# Patient Record
Sex: FEMALE | Race: WHITE | NOT HISPANIC OR LATINO | Employment: PART TIME | URBAN - METROPOLITAN AREA
[De-identification: names, ages, dates, MRNs, and addresses within clinical notes are randomized per-mention and may not be internally consistent; named-entity substitution may affect disease eponyms.]

---

## 2023-09-04 DIAGNOSIS — F32.A DEPRESSION, UNSPECIFIED DEPRESSION TYPE: ICD-10-CM

## 2023-09-05 RX ORDER — BUPROPION HYDROCHLORIDE 300 MG/1
TABLET ORAL
Qty: 30 TABLET | Refills: 0 | Status: SHIPPED | OUTPATIENT
Start: 2023-09-05 | End: 2023-09-28

## 2023-09-28 DIAGNOSIS — F32.A DEPRESSION, UNSPECIFIED DEPRESSION TYPE: ICD-10-CM

## 2023-09-28 RX ORDER — BUPROPION HYDROCHLORIDE 300 MG/1
TABLET ORAL
Qty: 30 TABLET | Refills: 0 | Status: SHIPPED | OUTPATIENT
Start: 2023-09-28 | End: 2023-09-29 | Stop reason: SDUPTHER

## 2023-09-29 ENCOUNTER — OFFICE VISIT (OUTPATIENT)
Dept: PRIMARY CARE | Facility: CLINIC | Age: 28
End: 2023-09-29
Payer: COMMERCIAL

## 2023-09-29 VITALS
OXYGEN SATURATION: 99 % | TEMPERATURE: 98 F | DIASTOLIC BLOOD PRESSURE: 72 MMHG | HEIGHT: 65 IN | WEIGHT: 163 LBS | HEART RATE: 93 BPM | BODY MASS INDEX: 27.16 KG/M2 | RESPIRATION RATE: 16 BRPM | SYSTOLIC BLOOD PRESSURE: 105 MMHG

## 2023-09-29 DIAGNOSIS — F32.A DEPRESSION, UNSPECIFIED DEPRESSION TYPE: ICD-10-CM

## 2023-09-29 DIAGNOSIS — Z00.00 HEALTHCARE MAINTENANCE: Primary | ICD-10-CM

## 2023-09-29 PROCEDURE — 99395 PREV VISIT EST AGE 18-39: CPT | Performed by: FAMILY MEDICINE

## 2023-09-29 RX ORDER — BUPROPION HYDROCHLORIDE 300 MG/1
300 TABLET ORAL DAILY
Qty: 90 TABLET | Refills: 3 | Status: SHIPPED | OUTPATIENT
Start: 2023-09-29 | End: 2024-09-28

## 2023-09-29 ASSESSMENT — ENCOUNTER SYMPTOMS
NEUROLOGICAL NEGATIVE: 1
GASTROINTESTINAL NEGATIVE: 1
ENDOCRINE NEGATIVE: 1
CARDIOVASCULAR NEGATIVE: 1
PSYCHIATRIC NEGATIVE: 1
RESPIRATORY NEGATIVE: 1
EYES NEGATIVE: 1
CONSTITUTIONAL NEGATIVE: 1

## 2023-09-29 NOTE — PROGRESS NOTES
"Subjective     Patient ID: Alison Weinberg is a 28 y.o. female who presents for Wellbutrin follow up.    Review of Systems   Constitutional: Negative.    HENT: Negative.     Eyes: Negative.    Respiratory: Negative.     Cardiovascular: Negative.    Gastrointestinal: Negative.    Endocrine: Negative.    Genitourinary: Negative.    Skin: Negative.    Neurological: Negative.    Psychiatric/Behavioral: Negative.         Objective     Vitals:    09/29/23 0930   BP: 105/72   Pulse: 93   Resp: 16   Temp: 36.7 °C (98 °F)   SpO2: 99%   Weight: 73.9 kg (163 lb)   Height: 1.651 m (5' 5\")        Current Outpatient Medications   Medication Instructions    buPROPion XL (Wellbutrin XL) 300 mg 24 hr tablet TAKE 1 TABLET BY MOUTH EVERY DAY    Dexcom G6  misc use as directed    Dexcom G6 Sensor device use 1 sensor every 10 days    Dexcom G6 Transmitter device use as directed    HumaLOG U-100 Insulin 100 unit/mL injection 60 units daily via insulin pump    Lantus Solostar U-100 Insulin 100 unit/mL (3 mL) pen subcutaneous    OneTouch Ultra Test strip Test 6 times daily    OneTouch Ultra2 Meter misc USE AS DIRECTED TO TEST BLOOD SUGAR    Vestura, 28, 3-0.02 mg tablet 1 tablet, oral, Daily        Physical Exam  Constitutional:       General: She is not in acute distress.     Appearance: Normal appearance.   HENT:      Head: Normocephalic and atraumatic.      Right Ear: Tympanic membrane normal.      Left Ear: Tympanic membrane normal.      Nose: Nose normal.      Mouth/Throat:      Pharynx: Oropharynx is clear.   Eyes:      Conjunctiva/sclera: Conjunctivae normal.      Pupils: Pupils are equal, round, and reactive to light.   Neck:      Vascular: No carotid bruit.   Cardiovascular:      Rate and Rhythm: Normal rate and regular rhythm.      Heart sounds: Normal heart sounds.   Pulmonary:      Effort: Pulmonary effort is normal.      Breath sounds: Normal breath sounds.   Abdominal:      General: Bowel sounds are normal.      " Palpations: Abdomen is soft.   Musculoskeletal:      Cervical back: Neck supple. No tenderness.   Skin:     General: Skin is warm and dry.   Neurological:      General: No focal deficit present.      Mental Status: She is alert.   Psychiatric:         Mood and Affect: Mood normal.         Behavior: Behavior normal.         Assessment/Plan   Problem List Items Addressed This Visit    None  Visit Diagnoses         Codes    Healthcare maintenance    -  Primary Z00.00

## 2023-11-17 ENCOUNTER — OFFICE VISIT (OUTPATIENT)
Dept: ENDOCRINOLOGY | Facility: CLINIC | Age: 28
End: 2023-11-17
Payer: COMMERCIAL

## 2023-11-17 VITALS
WEIGHT: 163 LBS | HEIGHT: 65 IN | BODY MASS INDEX: 27.16 KG/M2 | DIASTOLIC BLOOD PRESSURE: 75 MMHG | SYSTOLIC BLOOD PRESSURE: 113 MMHG

## 2023-11-17 DIAGNOSIS — Z96.41 INSULIN PUMP IN PLACE: ICD-10-CM

## 2023-11-17 DIAGNOSIS — E10.9 TYPE 1 DIABETES MELLITUS WITHOUT COMPLICATION (MULTI): Primary | ICD-10-CM

## 2023-11-17 LAB
POC FINGERSTICK BLOOD GLUCOSE: 119 MG/DL (ref 70–100)
POC HEMOGLOBIN A1C: 5.3 % (ref 4.2–6.5)

## 2023-11-17 PROCEDURE — 3074F SYST BP LT 130 MM HG: CPT | Performed by: HOSPITALIST

## 2023-11-17 PROCEDURE — 99214 OFFICE O/P EST MOD 30 MIN: CPT | Performed by: HOSPITALIST

## 2023-11-17 PROCEDURE — 3078F DIAST BP <80 MM HG: CPT | Performed by: HOSPITALIST

## 2023-11-17 PROCEDURE — 82962 GLUCOSE BLOOD TEST: CPT | Performed by: HOSPITALIST

## 2023-11-17 PROCEDURE — 83036 HEMOGLOBIN GLYCOSYLATED A1C: CPT | Performed by: HOSPITALIST

## 2023-11-17 RX ORDER — BLOOD SUGAR DIAGNOSTIC
STRIP MISCELLANEOUS
Qty: 200 EACH | Refills: 3 | Status: SHIPPED | OUTPATIENT
Start: 2023-11-17

## 2023-11-17 RX ORDER — GLUCAGON INJECTION, SOLUTION 1 MG/.2ML
1 INJECTION, SOLUTION SUBCUTANEOUS ONCE AS NEEDED
Qty: 0.2 ML | Refills: 1 | Status: SHIPPED | OUTPATIENT
Start: 2023-11-17

## 2023-11-17 ASSESSMENT — ENCOUNTER SYMPTOMS
VOICE CHANGE: 0
PHOTOPHOBIA: 0
AGITATION: 0
SLEEP DISTURBANCE: 0
PALPITATIONS: 0
EYE ITCHING: 0
CONSTIPATION: 0
NERVOUS/ANXIOUS: 0
DYSURIA: 0
CHEST TIGHTNESS: 0
SORE THROAT: 0
TREMORS: 0
FREQUENCY: 0
VOMITING: 0
HEADACHES: 0
NAUSEA: 0
LIGHT-HEADEDNESS: 0
ARTHRALGIAS: 0
SHORTNESS OF BREATH: 0
ABDOMINAL DISTENTION: 0
TROUBLE SWALLOWING: 0
DIARRHEA: 0
ABDOMINAL PAIN: 0
CONSTITUTIONAL NEGATIVE: 1

## 2023-11-17 NOTE — PROGRESS NOTES
"Subjective   Patient ID: Alison Weinberg is a 28 y.o. female who presents for Diabetes (PCP: Rojas /Dx: 5/13/2022 at [facility]: 2 bothers have type 1 diabetes / /Patient testing glucose 4 times daily with dexcom /due to fluctuating blood glucose, hypoglycemia and insulin pump management /Patient is adjusting meal time insulin 3 times daily based on glucose reading and sliding scale./ omnipod 5 and dexcom-LINKED /Last hga1c 4/3/23 result 5.1%).  Lab Results   Component Value Date    HGBA1C 5.3 11/17/2023      HPI  See AP     Review of Systems   Constitutional: Negative.    HENT:  Negative for sore throat, trouble swallowing and voice change.    Eyes:  Negative for photophobia, itching and visual disturbance.   Respiratory:  Negative for chest tightness and shortness of breath.    Cardiovascular:  Negative for chest pain and palpitations.   Gastrointestinal:  Negative for abdominal distention, abdominal pain, constipation, diarrhea, nausea and vomiting.   Endocrine: Negative for cold intolerance, heat intolerance and polyuria.   Genitourinary:  Negative for dysuria and frequency.   Musculoskeletal:  Negative for arthralgias.   Skin:  Negative for pallor.   Allergic/Immunologic: Negative for environmental allergies.   Neurological:  Negative for tremors, light-headedness and headaches.   Psychiatric/Behavioral:  Negative for agitation and sleep disturbance. The patient is not nervous/anxious.        Objective   Visit Vitals  /75   Ht 1.651 m (5' 5\")   Wt 73.9 kg (163 lb)   BMI 27.12 kg/m²   Smoking Status Never   BSA 1.84 m²      Physical Exam  Constitutional:       Appearance: Normal appearance.   HENT:      Head: Normocephalic.      Nose: Nose normal.      Mouth/Throat:      Mouth: Mucous membranes are moist.   Eyes:      Extraocular Movements: Extraocular movements intact.   Cardiovascular:      Rate and Rhythm: Normal rate.   Pulmonary:      Effort: Pulmonary effort is normal. No respiratory distress. "   Abdominal:      General: There is no distension.   Musculoskeletal:         General: Normal range of motion.      Cervical back: Normal range of motion and neck supple.   Skin:     General: Skin is warm and dry.   Neurological:      Mental Status: She is alert and oriented to person, place, and time.   Psychiatric:         Mood and Affect: Mood normal.         Assessment/Plan   Diagnoses and all orders for this visit:  Type 1 diabetes mellitus without complication (CMS/HCC)  -     POCT glucose manually resulted  -     POCT glycosylated hemoglobin (Hb A1C) manually resulted  -     OneTouch Ultra Test strip; Test 6 times daily  -     glucagon (Gvoke HypoPen 1-Pack) 1 mg/0.2 mL auto-injector; Inject 1 mg under the skin 1 time if needed (when sugar less than 70 and unable to take orally) for up to 1 dose.  Insulin pump in place       27 yo woman wit recent Dx of DM type 1 BRADY positive came for follow up   she was admitted to [facility] on May 12th 2022 with DKA and was discharged on may 15th.      She was started on omnipod 5 pump around 12/10/2022. she is also on Dexcom G6  Personally reviewed insulin pump data and interpreted the results  in automated mode 100 % TIR 87%, low 2%   She is more active with her new job and having low BG morning and sometimes afternoons   she comfortable doing carb counting but recently underestimating her carbs given fear of low BG   She feels low BG in her 60s.   Her automated basal / day is ~ 8.3 units/d vs 19.5 on manual mode   she mentions some lows on dexcom false but it does match most of the time      A1c at goal.  Too tight control at this time     PLAN :    Changed basal rate throughout the day- see download   - changed ICR to 12 ( from 8 ) at MN and 7 from 5 ( at 8 AM )   - Continue using activity mode   - no changes in pump today.   - she does have insulin pens in case of pump failure.   Discussed diabetic diet and diet modifications as a means to control diabetes  DIscussed  hypoglycemia and the Management of hypoglycemia. Sent GVOKE pen   labs before NV      CALL In 1 week with update on Bgs         RTC 3 m   Personally reviewed CGM data and interpreted the results     SH-- new job ,  very active at baseline.   4 siblings, on BC  , her  name- Lamonte  she is starting a new job with [employer] and will be more active now   FH- 2 brothers and maternal brother had T1 DM   PMH PSH reviewed

## 2023-12-14 DIAGNOSIS — E10.9 TYPE 1 DIABETES MELLITUS WITHOUT COMPLICATION (MULTI): ICD-10-CM

## 2023-12-14 RX ORDER — INSULIN LISPRO 100 [IU]/ML
INJECTION, SOLUTION INTRAVENOUS; SUBCUTANEOUS
Qty: 60 ML | Refills: 1 | Status: SHIPPED | OUTPATIENT
Start: 2023-12-14

## 2024-02-23 ENCOUNTER — TELEPHONE (OUTPATIENT)
Dept: ENDOCRINOLOGY | Facility: CLINIC | Age: 29
End: 2024-02-23
Payer: COMMERCIAL

## 2024-02-23 DIAGNOSIS — E10.9 TYPE 1 DIABETES MELLITUS WITHOUT COMPLICATION (MULTI): Primary | ICD-10-CM

## 2024-02-23 RX ORDER — INSULIN PMP CART,AUT,G6/7,CNTR
1 EACH SUBCUTANEOUS
Qty: 10 EACH | Refills: 3 | Status: SHIPPED | OUTPATIENT
Start: 2024-02-23

## 2024-02-23 NOTE — TELEPHONE ENCOUNTER
Alison called 2-23-24 asking for a refill on her Omnipods 5 G6 to changes every 3 days.  Please send to her local pharmacy on file.  Thank you

## 2024-03-11 ENCOUNTER — TELEMEDICINE (OUTPATIENT)
Dept: ENDOCRINOLOGY | Facility: CLINIC | Age: 29
End: 2024-03-11
Payer: COMMERCIAL

## 2024-03-11 DIAGNOSIS — E10.65 TYPE 1 DIABETES MELLITUS WITH HYPERGLYCEMIA, WITH LONG-TERM CURRENT USE OF INSULIN (MULTI): Primary | ICD-10-CM

## 2024-03-11 DIAGNOSIS — Z46.81 INSULIN PUMP FITTING OR ADJUSTMENT: ICD-10-CM

## 2024-03-11 PROCEDURE — 95251 CONT GLUC MNTR ANALYSIS I&R: CPT | Performed by: NURSE PRACTITIONER

## 2024-03-11 PROCEDURE — 99214 OFFICE O/P EST MOD 30 MIN: CPT | Performed by: NURSE PRACTITIONER

## 2024-03-11 ASSESSMENT — ENCOUNTER SYMPTOMS
NAUSEA: 0
WEAKNESS: 0
SEIZURES: 0
POLYDIPSIA: 0
PALPITATIONS: 0
ACTIVITY CHANGE: 0
NUMBNESS: 0
SHORTNESS OF BREATH: 0
FATIGUE: 0
CONSTIPATION: 0
FREQUENCY: 0
DIZZINESS: 0
APPETITE CHANGE: 0
SLEEP DISTURBANCE: 0
NERVOUS/ANXIOUS: 0
POLYPHAGIA: 0
DIARRHEA: 0

## 2024-03-11 NOTE — PATIENT INSTRUCTIONS
Type 1 diabetes mellitus, is not at goal. GMI 7.1% at today's visit.   RX changes:   We have made the following changes to her insulin pump settings:  -Insulin action time changed to 2.5 hours  -Target glucose changed to 120 mg/dL, We discussed changing the target to 110 mg/dL when she sees how the changes we have made above take effect.  -We discussed the use of exercise mode while she is at work to reduce the risk of hypoglycemia  -Insulin to carb ratio changed to 1 unit per 10 grams from 7 AM-midnight.   -We discussed that she has 1 brother that has not been diagnosed with type 1 diabetes.  We discussed that you can actually try antibodies for type I for that sibling.  We discussed that there is a CD3 monoclonal antibody infusion that delays the progression of type 1 diabetes.  Her brother could have free screening through [employer] and can access at [email or web address]  Education:  interpretation of lab results, blood sugar goals, and hypoglycemia prevention and treatment  Follow up: I recommend diabetes care be with Dr Garcia.

## 2024-03-11 NOTE — PROGRESS NOTES
Subjective   Alison Weinberg is a 28 y.o. female who presents for a follow-up evaluation of Type 1 diabetes mellitus. The initial diagnosis of diabetes was made at the age of 27. She has 2 brothers with type 1 and one that does not.    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     Known complications due to diabetes included none    Cardiovascular risk factors include diabetes mellitus. The patient is not on an ACE inhibitor or angiotensin II receptor blocker.     Current diabetes regimen is as follows:   OmniPod 5 with Dexcom G6     Hypoglycemia frequency: 1%  Hypoglycemia awareness: Yes   Glucagon prescription:  Yes    Exercise: daily   Meal panning: She is using carbohydrate counting.    Review of Systems   Constitutional:  Negative for activity change, appetite change and fatigue.   Respiratory:  Negative for shortness of breath.    Cardiovascular:  Negative for chest pain, palpitations and leg swelling.   Gastrointestinal:  Negative for constipation, diarrhea and nausea.   Endocrine: Negative for cold intolerance, heat intolerance, polydipsia, polyphagia and polyuria.   Genitourinary:  Negative for frequency.   Musculoskeletal:  Negative for gait problem.   Skin:  Negative for rash.   Neurological:  Negative for dizziness, seizures, weakness and numbness.   Psychiatric/Behavioral:  Negative for sleep disturbance and suicidal ideas. The patient is not nervous/anxious.        Objective   There were no vitals taken for this visit.  Physical Exam  Pulmonary:      Effort: Pulmonary effort is normal.   Neurological:      Mental Status: She is alert and oriented to person, place, and time.   Psychiatric:         Mood and Affect: Mood normal.         Behavior: Behavior normal.         Thought Content: Thought content normal.         Judgment: Judgment normal.           Health Maintenance:   Eye Exam: needs dilated eye exam within 5 years of diagnosis.  Lipid Panel: ordered for her next visit with   Radha  Urine Albumin: ordered for her next visit with Dr Garcia.     Assessment/Plan   Diagnoses and all orders for this visit:  Type 1 diabetes mellitus with hyperglycemia, with long-term current use of insulin (CMS/[facility])  Insulin pump fitting or adjustment      Type 1 diabetes mellitus, is not at goal. GMI 7.1% at today's visit.   RX changes:   We have made the following changes to her insulin pump settings:  -Insulin action time changed to 2.5 hours  -Target glucose changed to 120 mg/dL, We discussed changing the target to 110 mg/dL when she sees how the changes we have made above take effect.  -We discussed the use of exercise mode while she is at work to reduce the risk of hypoglycemia  -Insulin to carb ratio changed to 1 unit per 10 grams from 7 AM-midnight.   -We discussed that she has 1 brother that has not been diagnosed with type 1 diabetes.  We discussed that you can actually try antibodies for type I for that sibling.  We discussed that there is a CD3 monoclonal antibody infusion that delays the progression of type 1 diabetes.  Her brother could have free screening through [employer] and can access at [email or web address]  Education:  interpretation of lab results, blood sugar goals, and hypoglycemia prevention and treatment  Follow up: I recommend diabetes care be with Dr Garcia.

## 2024-07-01 ENCOUNTER — APPOINTMENT (OUTPATIENT)
Dept: ENDOCRINOLOGY | Facility: CLINIC | Age: 29
End: 2024-07-01
Payer: COMMERCIAL

## 2024-07-01 VITALS
WEIGHT: 152 LBS | DIASTOLIC BLOOD PRESSURE: 80 MMHG | BODY MASS INDEX: 25.33 KG/M2 | SYSTOLIC BLOOD PRESSURE: 118 MMHG | HEIGHT: 65 IN

## 2024-07-01 DIAGNOSIS — Z96.41 INSULIN PUMP IN PLACE: ICD-10-CM

## 2024-07-01 DIAGNOSIS — Z97.8 USES SELF-APPLIED CONTINUOUS GLUCOSE MONITORING DEVICE: ICD-10-CM

## 2024-07-01 DIAGNOSIS — E10.65 TYPE 1 DIABETES MELLITUS WITH HYPERGLYCEMIA, WITH LONG-TERM CURRENT USE OF INSULIN (MULTI): Primary | ICD-10-CM

## 2024-07-01 LAB
POC FINGERSTICK BLOOD GLUCOSE: 149 MG/DL (ref 70–100)
POC HEMOGLOBIN A1C: 6.2 % (ref 4.2–6.5)

## 2024-07-01 PROCEDURE — 3079F DIAST BP 80-89 MM HG: CPT | Performed by: HOSPITALIST

## 2024-07-01 PROCEDURE — 82962 GLUCOSE BLOOD TEST: CPT | Performed by: HOSPITALIST

## 2024-07-01 PROCEDURE — 99214 OFFICE O/P EST MOD 30 MIN: CPT | Performed by: HOSPITALIST

## 2024-07-01 PROCEDURE — 3074F SYST BP LT 130 MM HG: CPT | Performed by: HOSPITALIST

## 2024-07-01 PROCEDURE — 83036 HEMOGLOBIN GLYCOSYLATED A1C: CPT | Performed by: HOSPITALIST

## 2024-07-01 ASSESSMENT — ENCOUNTER SYMPTOMS
DYSURIA: 0
ABDOMINAL DISTENTION: 0
AGITATION: 0
EYE ITCHING: 0
VOMITING: 0
SLEEP DISTURBANCE: 0
CONSTITUTIONAL NEGATIVE: 1
VOICE CHANGE: 0
TREMORS: 0
NERVOUS/ANXIOUS: 0
CHEST TIGHTNESS: 0
SHORTNESS OF BREATH: 0
NAUSEA: 0
ABDOMINAL PAIN: 0
PALPITATIONS: 0
HEADACHES: 0
TROUBLE SWALLOWING: 0
FREQUENCY: 0
CONSTIPATION: 0
SORE THROAT: 0
PHOTOPHOBIA: 0
ARTHRALGIAS: 0
DIARRHEA: 0
LIGHT-HEADEDNESS: 0

## 2024-07-01 NOTE — PROGRESS NOTES
Subjective   Patient ID: Alison Weinberg is a 29 y.o. female who presents for Diabetes (Dx Dm1: age 27 /PCP: Rojas /Dx: 5/13/2022 at [facility]: 2 bothers have type 1 diabetes / /Patient testing glucose 4 times daily with dexcom /due to fluctuating blood glucose, hypoglycemia and insulin pump management /Patient is adjusting meal time insulin 3 times daily based on glucose reading and sliding scale./ omnipod 5 and dexcom-LINKED /11/2023 hga1c 5.3%).  Lab Results   Component Value Date    HGBA1C 6.2 07/01/2024      HPI   See AP     Review of Systems   Constitutional: Negative.    HENT:  Negative for sore throat, trouble swallowing and voice change.    Eyes:  Negative for photophobia, itching and visual disturbance.   Respiratory:  Negative for chest tightness and shortness of breath.    Cardiovascular:  Negative for chest pain and palpitations.   Gastrointestinal:  Negative for abdominal distention, abdominal pain, constipation, diarrhea, nausea and vomiting.   Endocrine: Negative for cold intolerance, heat intolerance and polyuria.   Genitourinary:  Negative for dysuria and frequency.   Musculoskeletal:  Negative for arthralgias.   Skin:  Negative for pallor.   Allergic/Immunologic: Negative for environmental allergies.   Neurological:  Negative for tremors, light-headedness and headaches.   Psychiatric/Behavioral:  Negative for agitation and sleep disturbance. The patient is not nervous/anxious.        Objective   Physical Exam  Constitutional:       Appearance: Normal appearance.   HENT:      Head: Normocephalic.      Nose: Nose normal.      Mouth/Throat:      Mouth: Mucous membranes are moist.   Eyes:      Extraocular Movements: Extraocular movements intact.   Cardiovascular:      Rate and Rhythm: Normal rate.   Pulmonary:      Effort: Pulmonary effort is normal. No respiratory distress.   Abdominal:      General: There is no distension.   Musculoskeletal:         General: Normal range of motion.      Cervical  "back: Normal range of motion and neck supple.   Skin:     General: Skin is warm and dry.   Neurological:      Mental Status: She is alert and oriented to person, place, and time.   Psychiatric:         Mood and Affect: Mood normal.      Visit Vitals  /80   Ht 1.651 m (5' 5\")   Wt 68.9 kg (152 lb)   BMI 25.29 kg/m²   Smoking Status Never   BSA 1.78 m²        Assessment/Plan   Diagnoses and all orders for this visit:  Type 1 diabetes mellitus with hyperglycemia, with long-term current use of insulin (Multi)  -     POCT glucose manually resulted  -     POCT glycosylated hemoglobin (Hb A1C) manually resulted  -     Lipid Panel; Future  -     Comprehensive metabolic panel; Future  -     TSH with reflex to Free T4 if abnormal; Future  -     Albumin-Creatinine Ratio, Urine Random; Future  -     glucagon (Glucagen) 1 mg injection; Inject 1 mg under the skin 1 time if needed for low blood sugar - see comments (when sugar less than 70 and  unable to take orally).  Insulin pump in place  Uses self-applied continuous glucose monitoring device       28 yo woman wit recent Dx of DM type 1 BRADY positive came for follow up   she was admitted to [facility] on May 12th 2022 with DKA and was discharged on may 15th.      She was started on omnipod 5 pump around 12/10/2022. she is also on Dexcom G6  Personally reviewed insulin pump data and interpreted the results  in automated mode 100 % TIR 69%, low 1%   She is more active with her job and having low BG morning and sometimes afternoons when she is more active   she comfortable doing carb counting but recently in past underestimating her carbs given fear of low BG   She feels low BG in her 60s.   Her automated basal / day is ~ 6.8units/d vs ~ 14.1 on manual mode        A1c at goal.     PLAN :    Start activity mode 12 pm - 3pm when its warmer and she tends to have lower Bgs   Changed basal rate from 12 - pm to 0.45 from 0.55   Changed ICR for bkfst to 10 from 12     - she does have " insulin pens in case of pump failure.   Discussed diabetic diet and diet modifications as a means to control diabetes  DIscussed hypoglycemia and the Management of hypoglycemia. GVOKE not covered , sent glucagon emergency kit , has a coller while driving.   labs today      FILLED FMLA for work. Given occ low BG and every 3-4 months apt    Strips not covered by insurance , advise to call and let us know which one will be covered by insurance   advised to wear a medical bracelet  She mentions she is not planning to conceive at this time     RTC 3 m   Personally reviewed CGM data and interpreted the results     SH-- 4 siblings, on BC  , her  name- Lamonte  Working with [employer] and more active now   FH- 2 brothers and maternal brother had T1 DM   PMH PSH reviewed

## 2024-07-16 ENCOUNTER — TELEPHONE (OUTPATIENT)
Dept: ENDOCRINOLOGY | Facility: CLINIC | Age: 29
End: 2024-07-16
Payer: COMMERCIAL

## 2024-07-16 DIAGNOSIS — E10.9 TYPE 1 DIABETES MELLITUS WITHOUT COMPLICATION (MULTI): ICD-10-CM

## 2024-07-16 RX ORDER — INSULIN PMP CART,AUT,G6/7,CNTR
1 EACH SUBCUTANEOUS
Qty: 10 EACH | Refills: 3 | Status: SHIPPED | OUTPATIENT
Start: 2024-07-16

## 2024-07-16 NOTE — TELEPHONE ENCOUNTER
Alison left a voice mail 7-16-24 asking for a refill on her Omnipod 5 G6  Pods.  #10 replaces  a pod every 3 days.  Please send to [facility] local [facility]. Thank you

## 2024-07-22 DIAGNOSIS — E10.9 TYPE 1 DIABETES MELLITUS WITHOUT COMPLICATION (MULTI): ICD-10-CM

## 2024-07-22 RX ORDER — INSULIN LISPRO 100 [IU]/ML
INJECTION, SOLUTION INTRAVENOUS; SUBCUTANEOUS
Qty: 20 ML | Refills: 5 | Status: SHIPPED | OUTPATIENT
Start: 2024-07-22

## 2024-10-15 DIAGNOSIS — F32.A DEPRESSION, UNSPECIFIED DEPRESSION TYPE: ICD-10-CM

## 2024-10-15 RX ORDER — BUPROPION HYDROCHLORIDE 300 MG/1
300 TABLET ORAL DAILY
Qty: 30 TABLET | Refills: 0 | Status: SHIPPED | OUTPATIENT
Start: 2024-10-15 | End: 2024-11-14

## 2024-10-15 NOTE — TELEPHONE ENCOUNTER
Last OV 9/29/2023.  Left msg for PT to schedule appt before next refill.   Please send one month supply.

## 2024-11-21 ENCOUNTER — APPOINTMENT (OUTPATIENT)
Dept: ENDOCRINOLOGY | Facility: CLINIC | Age: 29
End: 2024-11-21
Payer: COMMERCIAL

## 2025-03-07 ENCOUNTER — APPOINTMENT (OUTPATIENT)
Dept: ENDOCRINOLOGY | Facility: CLINIC | Age: 30
End: 2025-03-07
Payer: COMMERCIAL

## 2025-03-07 DIAGNOSIS — E10.9 TYPE 1 DIABETES MELLITUS WITHOUT COMPLICATION: Primary | ICD-10-CM

## 2025-03-07 PROCEDURE — 99214 OFFICE O/P EST MOD 30 MIN: CPT | Performed by: HOSPITALIST

## 2025-03-07 RX ORDER — BLOOD-GLUCOSE SENSOR
EACH MISCELLANEOUS
Qty: 5 EACH | Refills: 1 | Status: SHIPPED | OUTPATIENT
Start: 2025-03-07

## 2025-03-07 ASSESSMENT — ENCOUNTER SYMPTOMS
VOICE CHANGE: 0
FREQUENCY: 0
EYE ITCHING: 0
SLEEP DISTURBANCE: 0
PALPITATIONS: 0
VOMITING: 0
DYSURIA: 0
ABDOMINAL DISTENTION: 0
SORE THROAT: 0
PHOTOPHOBIA: 0
HEADACHES: 0
CHEST TIGHTNESS: 0
TREMORS: 0
SHORTNESS OF BREATH: 0
ABDOMINAL PAIN: 0
NERVOUS/ANXIOUS: 0
LIGHT-HEADEDNESS: 0
AGITATION: 0
DIARRHEA: 0
ARTHRALGIAS: 0
CONSTIPATION: 0
CONSTITUTIONAL NEGATIVE: 1
NAUSEA: 0
TROUBLE SWALLOWING: 0

## 2025-03-07 NOTE — PROGRESS NOTES
Subjective   Patient ID: Alison Weinberg is a 29 y.o. female who presents for Diabetes (Was on omnipod and dexcom/ no longer covered by insurance/ Would like to discuss pens and freesyle cgm/  does not have enough supplies to last her until her apt on 3/19/2025).  HPI   See AP     Review of Systems   Constitutional: Negative.    HENT:  Negative for sore throat, trouble swallowing and voice change.    Eyes:  Negative for photophobia, itching and visual disturbance.   Respiratory:  Negative for chest tightness and shortness of breath.    Cardiovascular:  Negative for chest pain and palpitations.   Gastrointestinal:  Negative for abdominal distention, abdominal pain, constipation, diarrhea, nausea and vomiting.   Endocrine: Negative for cold intolerance, heat intolerance and polyuria.   Genitourinary:  Negative for dysuria and frequency.   Musculoskeletal:  Negative for arthralgias.   Skin:  Negative for pallor.   Allergic/Immunologic: Negative for environmental allergies.   Neurological:  Negative for tremors, light-headedness and headaches.   Psychiatric/Behavioral:  Negative for agitation and sleep disturbance. The patient is not nervous/anxious.        Objective   Physical Examno labs due to vrtual visit    Assessment/Plan   Diagnoses and all orders for this visit:  Type 1 diabetes mellitus without complication  -     blood-glucose sensor (FreeStyle Berhane 3 Plus Sensor) device; Change every 15 days.            28 yo woman wit recent Dx of DM type 1 BRADY positive came for follow up   she was admitted to [facility] on May 12th 2022 with DKA and was discharged on may 15th 2022      She was started on omnipod 5 pump around 12/10/2022. NOT ON OMNIPOD 5 and DEXCOM G6 CGM since Jan 2025 dur  to insurance not covering it   Currently on basal bolus regimen     Current regimen :   Using humalog 1 unit for 10 g carbs. Approx . No SSI    Lantus 0-0-0-16      BG checks multiple times a day . Recent high BG   Received GC injection few  days.   Mostly 100s before that.   Hypoglycemia: once in last one week, early morning 55. Twice in last one month      PLAN :   Continue same lantus   Humalog 1 : 10f=g carbs , start SSI 1:50 b> 150, for next 2 days can use ICR 6 given recent GC injection   [facility] info given   Sent Berhane  CGM    Discussed insulin should not cost > 35 $ per month per insulin   Advised to discuss with insurance which insulin pump will be covered   Discussed diabetic diet and diet modifications as a means to control diabetes  DIscussed hypoglycemia and the Management of hypoglycemia. GVOKE not covered in past,\    RTC 2 weeks     SH-- 4 siblings   Recently got - Lamonte  Working with [employer] services and more active now   FH- 2 brothers and maternal brother had T1 DM   PMH PSH reviewed        Virtual or Telephone Consent    An interactive audio and video telecommunication system which permits real time communications between the patient (at the originating site) and provider (at the distant site) was utilized to provide this telehealth service.   Verbal consent was requested and obtained from Alison Weinberg on this date, 03/07/25 for a telehealth visit and the patient's location was confirmed at the time of the visit.

## 2025-03-19 ENCOUNTER — APPOINTMENT (OUTPATIENT)
Dept: ENDOCRINOLOGY | Facility: CLINIC | Age: 30
End: 2025-03-19
Payer: COMMERCIAL

## 2025-03-26 ENCOUNTER — APPOINTMENT (OUTPATIENT)
Dept: ENDOCRINOLOGY | Facility: CLINIC | Age: 30
End: 2025-03-26
Payer: COMMERCIAL

## 2025-03-26 VITALS
WEIGHT: 161 LBS | BODY MASS INDEX: 26.82 KG/M2 | DIASTOLIC BLOOD PRESSURE: 62 MMHG | SYSTOLIC BLOOD PRESSURE: 126 MMHG | HEIGHT: 65 IN

## 2025-03-26 DIAGNOSIS — E10.9 TYPE 1 DIABETES MELLITUS WITHOUT COMPLICATION: ICD-10-CM

## 2025-03-26 DIAGNOSIS — Z97.8 USES SELF-APPLIED CONTINUOUS GLUCOSE MONITORING DEVICE: ICD-10-CM

## 2025-03-26 DIAGNOSIS — E10.9 TYPE 1 DIABETES MELLITUS WITHOUT COMPLICATION: Primary | ICD-10-CM

## 2025-03-26 LAB
POC FINGERSTICK BLOOD GLUCOSE: 219 MG/DL (ref 70–100)
POC HEMOGLOBIN A1C: 7.2 % (ref 4.2–6.5)

## 2025-03-26 PROCEDURE — 82962 GLUCOSE BLOOD TEST: CPT | Performed by: HOSPITALIST

## 2025-03-26 PROCEDURE — 3074F SYST BP LT 130 MM HG: CPT | Performed by: HOSPITALIST

## 2025-03-26 PROCEDURE — 3078F DIAST BP <80 MM HG: CPT | Performed by: HOSPITALIST

## 2025-03-26 PROCEDURE — 95251 CONT GLUC MNTR ANALYSIS I&R: CPT | Performed by: HOSPITALIST

## 2025-03-26 PROCEDURE — 99214 OFFICE O/P EST MOD 30 MIN: CPT | Performed by: HOSPITALIST

## 2025-03-26 PROCEDURE — 83036 HEMOGLOBIN GLYCOSYLATED A1C: CPT | Performed by: HOSPITALIST

## 2025-03-26 PROCEDURE — 3008F BODY MASS INDEX DOCD: CPT | Performed by: HOSPITALIST

## 2025-03-26 RX ORDER — INSULIN LISPRO 100 [IU]/ML
INJECTION, SOLUTION INTRAVENOUS; SUBCUTANEOUS
Qty: 36 ML | Refills: 3 | Status: SHIPPED | OUTPATIENT
Start: 2025-03-26

## 2025-03-26 RX ORDER — NEEDLES, SAFETY 22GX1 1/2"
1 NEEDLE, DISPOSABLE MISCELLANEOUS 3 TIMES DAILY
Qty: 100 EACH | Refills: 11 | Status: SHIPPED | OUTPATIENT
Start: 2025-03-26

## 2025-03-26 RX ORDER — INSULIN GLARGINE 100 [IU]/ML
16 INJECTION, SOLUTION SUBCUTANEOUS NIGHTLY
Qty: 14.4 ML | Refills: 2 | Status: SHIPPED | OUTPATIENT
Start: 2025-03-26 | End: 2025-03-27

## 2025-03-26 ASSESSMENT — ENCOUNTER SYMPTOMS
HEADACHES: 0
ARTHRALGIAS: 0
VOMITING: 0
NAUSEA: 0
TROUBLE SWALLOWING: 0
SLEEP DISTURBANCE: 0
CHEST TIGHTNESS: 0
SORE THROAT: 0
PHOTOPHOBIA: 0
EYE ITCHING: 0
AGITATION: 0
ABDOMINAL DISTENTION: 0
SHORTNESS OF BREATH: 0
PALPITATIONS: 0
CONSTIPATION: 0
NERVOUS/ANXIOUS: 0
VOICE CHANGE: 0
ABDOMINAL PAIN: 0
CONSTITUTIONAL NEGATIVE: 1
LIGHT-HEADEDNESS: 0
FREQUENCY: 0
DYSURIA: 0
DIARRHEA: 0
TREMORS: 0

## 2025-03-26 NOTE — PROGRESS NOTES
Subjective   Patient ID: Alison Weinberg is a 29 y.o. female who presents for Diabetes ( (Dx Dm1: age 27 /PCP: Rojas /Dx: 5/13/2022 at [facility] [place] /[facility]: 2 bothers have type 1 diabetes / /Patient testing glucose 4 times daily with Berhane 3 /due to fluctuating blood glucose, hypoglycemia  /Patient is adjusting meal time insulin 3 times daily based on glucose reading and sliding scale.Compliant with diabetic regime. Pt is adhering and benefiting from CGM treatment plan).  HPI   Lab Results   Component Value Date    HGBA1C 7.2 (A) 03/26/2025     See AP       Review of Systems   Constitutional: Negative.    HENT:  Negative for sore throat, trouble swallowing and voice change.    Eyes:  Negative for photophobia, itching and visual disturbance.   Respiratory:  Negative for chest tightness and shortness of breath.    Cardiovascular:  Negative for chest pain and palpitations.   Gastrointestinal:  Negative for abdominal distention, abdominal pain, constipation, diarrhea, nausea and vomiting.   Endocrine: Negative for cold intolerance, heat intolerance and polyuria.   Genitourinary:  Negative for dysuria and frequency.   Musculoskeletal:  Negative for arthralgias.   Skin:  Negative for pallor.   Allergic/Immunologic: Negative for environmental allergies.   Neurological:  Negative for tremors, light-headedness and headaches.   Psychiatric/Behavioral:  Negative for agitation and sleep disturbance. The patient is not nervous/anxious.        Objective   Physical Exam  Constitutional:       Appearance: Normal appearance.   HENT:      Head: Normocephalic.      Nose: Nose normal.      Mouth/Throat:      Mouth: Mucous membranes are moist.   Eyes:      Extraocular Movements: Extraocular movements intact.   Cardiovascular:      Rate and Rhythm: Normal rate.   Pulmonary:      Effort: Pulmonary effort is normal. No respiratory distress.   Abdominal:      General: There is no distension.   Musculoskeletal:         General: Normal range of motion.  "     Cervical back: Normal range of motion and neck supple.   Skin:     General: Skin is warm and dry.   Neurological:      Mental Status: She is alert and oriented to person, place, and time.   Psychiatric:         Mood and Affect: Mood normal.      Visit Vitals  /62   Ht 1.651 m (5' 5\")   Wt 73 kg (161 lb)   BMI 26.79 kg/m²   Smoking Status Never   BSA 1.83 m²        Assessment/Plan   Diagnoses and all orders for this visit:  Type 1 diabetes mellitus without complication  -     POCT glycosylated hemoglobin (Hb A1C) manually resulted  -     POCT fingerstick glucose manually resulted  -     Lantus Solostar U-100 Insulin 100 unit/mL (3 mL) pen; Inject 16 Units under the skin once daily at bedtime.  -     insulin lispro (HumaLOG) 100 unit/mL pen; 1 unit for 10 g carbs with sliding scale, ( max 40 units daily)  Uses self-applied continuous glucose monitoring device            28 yo woman wit recent Dx of DM type 1 BRADY positive came for follow up   she was admitted to [facility] on May 12th 2022 with DKA and was discharged on may 15th 2022      She was started on omnipod 5 pump around 12/10/2022. NOT ON OMNIPOD 5 and DEXCOM G6 CGM since Jan 2025 dur  to insurance not covering it   Currently on basal bolus regimen     Current regimen :   Using humalog 1 unit for 10 g carbs. Approx . No SSI    Lantus 0-0-0-16 ( sometimes takes 14 units)     CGM downloaded and reviewed. Active time 55 % , TIR 61 % , low 1 % avg      PLAN :   Continue same lantus   Continue Humalog 1 : 10g carbs , SSI 1:50 > 150  [place] info given in past   Discussed insulin should not cost > 35 $ per month per insulin   Advised to discuss with insurance which insulin pump will be covered . She would like to go back on omnipod 5 insulin pump   Discussed diabetic diet and diet modifications as a means to control diabetes  DIscussed hypoglycemia and the Management of hypoglycemia. GVOKE not covered in past,\    RTC 2 weeks     SH-- 4 " siblings   Recently got - Lamonte  Working with [employer] and more active now   FH- 2 brothers and maternal brother had T1 DM   PMH PSH reviewed

## 2025-03-27 RX ORDER — INSULIN GLARGINE-YFGN 100 [IU]/ML
INJECTION, SOLUTION SUBCUTANEOUS
Qty: 15 ML | Refills: 2 | Status: SHIPPED | OUTPATIENT
Start: 2025-03-27

## 2025-03-28 ENCOUNTER — APPOINTMENT (OUTPATIENT)
Dept: OBSTETRICS AND GYNECOLOGY | Facility: CLINIC | Age: 30
End: 2025-03-28
Payer: COMMERCIAL

## 2025-03-28 DIAGNOSIS — E10.9 TYPE 1 DIABETES MELLITUS WITHOUT COMPLICATION: ICD-10-CM

## 2025-04-01 RX ORDER — INSULIN GLARGINE 100 [IU]/ML
INJECTION, SOLUTION SUBCUTANEOUS
Qty: 15 ML | Refills: 2 | Status: SHIPPED | OUTPATIENT
Start: 2025-04-01

## 2025-04-02 ENCOUNTER — APPOINTMENT (OUTPATIENT)
Facility: CLINIC | Age: 30
End: 2025-04-02
Payer: COMMERCIAL

## 2025-04-02 VITALS
WEIGHT: 161 LBS | HEIGHT: 65 IN | HEART RATE: 83 BPM | TEMPERATURE: 98.8 F | BODY MASS INDEX: 26.82 KG/M2 | OXYGEN SATURATION: 98 % | SYSTOLIC BLOOD PRESSURE: 116 MMHG | DIASTOLIC BLOOD PRESSURE: 80 MMHG | RESPIRATION RATE: 18 BRPM

## 2025-04-02 DIAGNOSIS — F32.A DEPRESSION, UNSPECIFIED DEPRESSION TYPE: ICD-10-CM

## 2025-04-02 DIAGNOSIS — Z00.00 HEALTHCARE MAINTENANCE: Primary | ICD-10-CM

## 2025-04-02 PROCEDURE — 3008F BODY MASS INDEX DOCD: CPT | Performed by: FAMILY MEDICINE

## 2025-04-02 PROCEDURE — 93000 ELECTROCARDIOGRAM COMPLETE: CPT | Performed by: FAMILY MEDICINE

## 2025-04-02 PROCEDURE — 99395 PREV VISIT EST AGE 18-39: CPT | Performed by: FAMILY MEDICINE

## 2025-04-02 PROCEDURE — 1036F TOBACCO NON-USER: CPT | Performed by: FAMILY MEDICINE

## 2025-04-02 RX ORDER — BUPROPION HYDROCHLORIDE 300 MG/1
300 TABLET ORAL DAILY
Qty: 90 TABLET | Refills: 3 | Status: SHIPPED | OUTPATIENT
Start: 2025-04-02 | End: 2026-04-02

## 2025-04-02 ASSESSMENT — ENCOUNTER SYMPTOMS
EYES NEGATIVE: 1
CARDIOVASCULAR NEGATIVE: 1
GASTROINTESTINAL NEGATIVE: 1
CONSTITUTIONAL NEGATIVE: 1
ENDOCRINE NEGATIVE: 1
NEUROLOGICAL NEGATIVE: 1
PSYCHIATRIC NEGATIVE: 1
RESPIRATORY NEGATIVE: 1

## 2025-04-02 ASSESSMENT — PATIENT HEALTH QUESTIONNAIRE - PHQ9
SUM OF ALL RESPONSES TO PHQ9 QUESTIONS 1 AND 2: 2
2. FEELING DOWN, DEPRESSED OR HOPELESS: SEVERAL DAYS
10. IF YOU CHECKED OFF ANY PROBLEMS, HOW DIFFICULT HAVE THESE PROBLEMS MADE IT FOR YOU TO DO YOUR WORK, TAKE CARE OF THINGS AT HOME, OR GET ALONG WITH OTHER PEOPLE: SOMEWHAT DIFFICULT
1. LITTLE INTEREST OR PLEASURE IN DOING THINGS: SEVERAL DAYS

## 2025-04-02 NOTE — PROGRESS NOTES
"Subjective     Patient ID: Alison Weinberg is a 29 y.o. female who presents for Annual Exam.      Review of Systems   Constitutional: Negative.    HENT: Negative.     Eyes: Negative.    Respiratory: Negative.     Cardiovascular: Negative.    Gastrointestinal: Negative.    Endocrine: Negative.    Genitourinary: Negative.    Skin: Negative.    Neurological: Negative.    Psychiatric/Behavioral: Negative.         Objective     Vitals:    04/02/25 0917   BP: 116/80   Pulse: 83   Resp: 18   Temp: 37.1 °C (98.8 °F)   TempSrc: Temporal   SpO2: 98%   Weight: 73 kg (161 lb)   Height: 1.651 m (5' 5\")        Current Outpatient Medications   Medication Instructions    blood-glucose sensor (FreeStyle Berhane 3 Plus Sensor) device Change every 15 days.    buPROPion XL (WELLBUTRIN XL) 300 mg, oral, Daily, Do not crush, chew, or split.    glucagon (GLUCAGEN) 1 mg, subcutaneous, Once as needed    insulin glargine (Basaglar KwikPen U-100 Insulin) 100 unit/mL (3 mL) pen Inject 16 units under the skin once daily at bedtime    insulin lispro (HumaLOG) 100 unit/mL pen 1 unit for 10 g carbs with sliding scale, ( max 40 units daily)    insulin syringe,safety needle (BD SafetyGlide Insulin Syringe) 0.3 mL 31 gauge x 5/16\" syringe 1 Syringe, miscellaneous, 3 times daily    OneTouch Ultra Test strip Test 6 times daily        Physical Exam  Constitutional:       General: She is not in acute distress.     Appearance: Normal appearance.   HENT:      Head: Normocephalic and atraumatic.      Right Ear: Tympanic membrane normal.      Left Ear: Tympanic membrane normal.      Nose: Nose normal.      Mouth/Throat:      Pharynx: Oropharynx is clear.   Eyes:      Conjunctiva/sclera: Conjunctivae normal.      Pupils: Pupils are equal, round, and reactive to light.   Neck:      Vascular: No carotid bruit.   Cardiovascular:      Rate and Rhythm: Normal rate and regular rhythm.      Heart sounds: Normal heart sounds.   Pulmonary:      Effort: Pulmonary effort is " normal.      Breath sounds: Normal breath sounds.   Abdominal:      General: Bowel sounds are normal.      Palpations: Abdomen is soft.   Musculoskeletal:      Cervical back: Neck supple. No tenderness.   Skin:     General: Skin is warm and dry.   Neurological:      General: No focal deficit present.      Mental Status: She is alert.   Psychiatric:         Mood and Affect: Mood normal.         Behavior: Behavior normal.         Assessment/Plan   Assessment & Plan  Healthcare maintenance    Orders:    ECG 12 lead (Clinic Performed)    CBC; Future    Comprehensive Metabolic Panel; Future    Lipid Panel; Future    Depression, unspecified depression type    Orders:    buPROPion XL (Wellbutrin XL) 300 mg 24 hr tablet; Take 1 tablet (300 mg) by mouth once daily. Do not crush, chew, or split.

## 2025-04-03 LAB
ALBUMIN SERPL-MCNC: 4.1 G/DL (ref 3.6–5.1)
ALP SERPL-CCNC: 57 U/L (ref 31–125)
ALT SERPL-CCNC: 12 U/L (ref 6–29)
ANION GAP SERPL CALCULATED.4IONS-SCNC: 9 MMOL/L (CALC) (ref 7–17)
AST SERPL-CCNC: 12 U/L (ref 10–30)
BILIRUB SERPL-MCNC: 0.6 MG/DL (ref 0.2–1.2)
BUN SERPL-MCNC: 13 MG/DL (ref 7–25)
CALCIUM SERPL-MCNC: 9.7 MG/DL (ref 8.6–10.2)
CHLORIDE SERPL-SCNC: 103 MMOL/L (ref 98–110)
CHOLEST SERPL-MCNC: 223 MG/DL
CHOLEST/HDLC SERPL: 1.9 (CALC)
CO2 SERPL-SCNC: 25 MMOL/L (ref 20–32)
CREAT SERPL-MCNC: 0.67 MG/DL (ref 0.5–0.96)
EGFRCR SERPLBLD CKD-EPI 2021: 122 ML/MIN/1.73M2
ERYTHROCYTE [DISTWIDTH] IN BLOOD BY AUTOMATED COUNT: 12.2 % (ref 11–15)
GLUCOSE SERPL-MCNC: 98 MG/DL (ref 65–99)
HCT VFR BLD AUTO: 42.3 % (ref 35–45)
HDLC SERPL-MCNC: 118 MG/DL
HGB BLD-MCNC: 13.9 G/DL (ref 11.7–15.5)
LDLC SERPL CALC-MCNC: 85 MG/DL (CALC)
MCH RBC QN AUTO: 32.8 PG (ref 27–33)
MCHC RBC AUTO-ENTMCNC: 32.9 G/DL (ref 32–36)
MCV RBC AUTO: 99.8 FL (ref 80–100)
NONHDLC SERPL-MCNC: 105 MG/DL (CALC)
PLATELET # BLD AUTO: 286 THOUSAND/UL (ref 140–400)
PMV BLD REES-ECKER: 11.5 FL (ref 7.5–12.5)
POTASSIUM SERPL-SCNC: 4.9 MMOL/L (ref 3.5–5.3)
PROT SERPL-MCNC: 6.8 G/DL (ref 6.1–8.1)
RBC # BLD AUTO: 4.24 MILLION/UL (ref 3.8–5.1)
SODIUM SERPL-SCNC: 137 MMOL/L (ref 135–146)
TRIGL SERPL-MCNC: 107 MG/DL
WBC # BLD AUTO: 5.1 THOUSAND/UL (ref 3.8–10.8)

## 2025-04-23 ENCOUNTER — APPOINTMENT (OUTPATIENT)
Dept: OBSTETRICS AND GYNECOLOGY | Facility: CLINIC | Age: 30
End: 2025-04-23
Payer: COMMERCIAL

## 2025-04-23 VITALS
WEIGHT: 164 LBS | SYSTOLIC BLOOD PRESSURE: 120 MMHG | HEART RATE: 103 BPM | BODY MASS INDEX: 27.32 KG/M2 | DIASTOLIC BLOOD PRESSURE: 79 MMHG | HEIGHT: 65 IN

## 2025-04-23 DIAGNOSIS — Z01.419 WELL WOMAN EXAM WITH ROUTINE GYNECOLOGICAL EXAM: Primary | ICD-10-CM

## 2025-04-23 DIAGNOSIS — Z30.41 ENCOUNTER FOR SURVEILLANCE OF CONTRACEPTIVE PILLS: ICD-10-CM

## 2025-04-23 PROCEDURE — 3008F BODY MASS INDEX DOCD: CPT

## 2025-04-23 PROCEDURE — 87491 CHLMYD TRACH DNA AMP PROBE: CPT

## 2025-04-23 PROCEDURE — 87591 N.GONORRHOEAE DNA AMP PROB: CPT

## 2025-04-23 PROCEDURE — 99385 PREV VISIT NEW AGE 18-39: CPT

## 2025-04-23 PROCEDURE — 1036F TOBACCO NON-USER: CPT

## 2025-04-23 PROCEDURE — 87661 TRICHOMONAS VAGINALIS AMPLIF: CPT

## 2025-04-23 RX ORDER — DROSPIRENONE AND ETHINYL ESTRADIOL 0.02-3(28)
1 KIT ORAL DAILY
Qty: 84 TABLET | Refills: 3 | Status: SHIPPED | OUTPATIENT
Start: 2025-04-23

## 2025-04-23 RX ORDER — DROSPIRENONE AND ETHINYL ESTRADIOL 0.02-3(28)
1 KIT ORAL DAILY
COMMUNITY
Start: 2025-03-01 | End: 2025-04-23 | Stop reason: SDUPTHER

## 2025-04-23 NOTE — PROGRESS NOTES
"Subjective   Alison Weinberg is a 29 y.o. female who is here for a routine exam.     GYN & Sexual Hx.:   - Last pap  per patient, normal.  - History of abnormal Pap smear: no  - Contraception: OCPs, Vestura.  - Menstrual Periods: Regular, monthly.    Sexually active with new partner for the past several months.  Recently .     OB Hx.:       Regular self breast exam: no   History of abnormal mammogram: no  Family history of breast cancer: yes - Paternal aunt.    Occupation:  [employer] , Tobacco/alcohol/caffeine: no tobacco use and alcohol intake:social drinker, and Illicit drugs: no history of illicit drug use    Diet: general  Exercise: regularly as directed    Review of Systems   All other systems reviewed and are negative.      Objective   /79 (BP Location: Right arm, Patient Position: Sitting, BP Cuff Size: Adult)   Pulse 103   Ht 1.651 m (5' 5\")   Wt 74.4 kg (164 lb)   LMP 2025   BMI 27.29 kg/m²      General:   alert and oriented, in no acute distress           Lungs: Normal respiratory effort.    Breasts: Soft, symmetric, no skin dimpling or nipple discharge, no palpable masses or axillary nodes.   Abdomen: soft, non-tender, without masses or organomegaly   Vulva: normal, Bartholin's, Urethra, [place]'s normal   Vagina: normal mucosa, normal discharge   Cervix: no lesions           Neuro: age-appropriate affect, behavior and speech, no gross motor deficits, normal gait     Assessment      29 y.o.  woman for annual GYN exam.     - Health Maintenance --> Routine follow up with PCP for health maintenance examination encouraged, including TSH, cholesterol, and Vit. D evaluation.  Self breast exam encouraged; concerning characteristics of breasts reviewed - Pt. will report general concerns, any adherent lumps, skin dimpling/puckering or color changes, and any nipple discharge.  Diet and exercise reviewed.   Pap done today - Reviewed ACOG and ASCCP guidelines with pt. If normal, " will repeat in 3 years.     - STI screening done today at patient request.     - Contraception Plan -- Continue OCPs, no contraindications to continued use; ACHES warning s/sx reviewed.  [facility] Cat 2 for T1DM.      - F/U 1 year or as needed.    Teresa Hancock, ERICA-TESSAM

## 2025-04-24 LAB
C TRACH RRNA SPEC QL NAA+PROBE: NEGATIVE
N GONORRHOEA DNA SPEC QL PROBE+SIG AMP: NEGATIVE
T VAGINALIS RRNA SPEC QL NAA+PROBE: NEGATIVE

## 2025-05-13 LAB
CYTOLOGY CMNT CVX/VAG CYTO-IMP: NORMAL
LAB AP HPV GENOTYPE QUESTION: YES
LAB AP HPV HR: NORMAL
LAB AP PAP ADDITIONAL TESTS: NORMAL
LABORATORY COMMENT REPORT: NORMAL
LMP START DATE: NORMAL
PATH REPORT.TOTAL CANCER: NORMAL

## 2025-07-15 ENCOUNTER — APPOINTMENT (OUTPATIENT)
Dept: ENDOCRINOLOGY | Facility: CLINIC | Age: 30
End: 2025-07-15
Payer: COMMERCIAL

## 2025-07-15 VITALS
HEIGHT: 65 IN | DIASTOLIC BLOOD PRESSURE: 66 MMHG | WEIGHT: 160 LBS | SYSTOLIC BLOOD PRESSURE: 108 MMHG | BODY MASS INDEX: 26.66 KG/M2

## 2025-07-15 DIAGNOSIS — E10.9 TYPE 1 DIABETES MELLITUS WITHOUT COMPLICATION: Primary | ICD-10-CM

## 2025-07-15 DIAGNOSIS — Z97.8 USES SELF-APPLIED CONTINUOUS GLUCOSE MONITORING DEVICE: ICD-10-CM

## 2025-07-15 LAB
POC FINGERSTICK BLOOD GLUCOSE: 202 MG/DL (ref 70–100)
POC HEMOGLOBIN A1C: 7.1 % (ref 4.2–6.5)

## 2025-07-15 PROCEDURE — 3051F HG A1C>EQUAL 7.0%<8.0%: CPT | Performed by: HOSPITALIST

## 2025-07-15 PROCEDURE — 83036 HEMOGLOBIN GLYCOSYLATED A1C: CPT | Performed by: HOSPITALIST

## 2025-07-15 PROCEDURE — 99214 OFFICE O/P EST MOD 30 MIN: CPT | Performed by: HOSPITALIST

## 2025-07-15 PROCEDURE — 3008F BODY MASS INDEX DOCD: CPT | Performed by: HOSPITALIST

## 2025-07-15 PROCEDURE — 95251 CONT GLUC MNTR ANALYSIS I&R: CPT | Performed by: HOSPITALIST

## 2025-07-15 PROCEDURE — 82962 GLUCOSE BLOOD TEST: CPT | Performed by: HOSPITALIST

## 2025-07-15 PROCEDURE — 3078F DIAST BP <80 MM HG: CPT | Performed by: HOSPITALIST

## 2025-07-15 PROCEDURE — 3074F SYST BP LT 130 MM HG: CPT | Performed by: HOSPITALIST

## 2025-07-15 ASSESSMENT — ENCOUNTER SYMPTOMS
LIGHT-HEADEDNESS: 0
SHORTNESS OF BREATH: 0
TROUBLE SWALLOWING: 0
VOICE CHANGE: 0
VOMITING: 0
SLEEP DISTURBANCE: 0
ARTHRALGIAS: 0
DIARRHEA: 0
EYE ITCHING: 0
NERVOUS/ANXIOUS: 0
CHEST TIGHTNESS: 0
CONSTIPATION: 0
NAUSEA: 0
PALPITATIONS: 0
AGITATION: 0
ABDOMINAL DISTENTION: 0
CONSTITUTIONAL NEGATIVE: 1
ABDOMINAL PAIN: 0
TREMORS: 0
PHOTOPHOBIA: 0
HEADACHES: 0
DYSURIA: 0
FREQUENCY: 0
SORE THROAT: 0

## 2025-07-15 NOTE — PROGRESS NOTES
Subjective   Patient ID: ( (Dx Dm1: age 27 /PCP: Rojas /Dx: 5/13/2022 at [facility] /[facility]: 2 bothers have type 1 diabetes / /Patient testing glucose 4 times daily with Berhane 3 /due to fluctuating blood glucose, hypoglycemia  /Patient is adjusting meal time insulin 3 times daily based on glucose reading and sliding scale.Compliant with diabetic regime. Pt is adhering and benefiting from CGM treatment plan).   HPI   Lab Results   Component Value Date    HGBA1C 7.1 (A) 07/15/2025     See AP       Review of Systems   Constitutional: Negative.    HENT:  Negative for sore throat, trouble swallowing and voice change.    Eyes:  Negative for photophobia, itching and visual disturbance.   Respiratory:  Negative for chest tightness and shortness of breath.    Cardiovascular:  Negative for chest pain and palpitations.   Gastrointestinal:  Negative for abdominal distention, abdominal pain, constipation, diarrhea, nausea and vomiting.   Endocrine: Negative for cold intolerance, heat intolerance and polyuria.   Genitourinary:  Negative for dysuria and frequency.   Musculoskeletal:  Negative for arthralgias.   Skin:  Negative for pallor.   Allergic/Immunologic: Negative for environmental allergies.   Neurological:  Negative for tremors, light-headedness and headaches.   Psychiatric/Behavioral:  Negative for agitation and sleep disturbance. The patient is not nervous/anxious.        Objective   Physical Exam  Constitutional:       Appearance: Normal appearance.   HENT:      Head: Normocephalic.      Nose: Nose normal.      Mouth/Throat:      Mouth: Mucous membranes are moist.   Eyes:      Extraocular Movements: Extraocular movements intact.   Cardiovascular:      Rate and Rhythm: Normal rate.   Pulmonary:      Effort: Pulmonary effort is normal. No respiratory distress.   Abdominal:      General: There is no distension.   Musculoskeletal:         General: Normal range of motion.      Cervical back: Normal range of motion and neck supple.  "  Skin:     General: Skin is warm and dry.   Neurological:      Mental Status: She is alert and oriented to person, place, and time.   Psychiatric:         Mood and Affect: Mood normal.    Visit Vitals  /66   Ht 1.651 m (5' 5\")   Wt 72.6 kg (160 lb)   BMI 26.63 kg/m²   Smoking Status Never   BSA 1.82 m²        Assessment/Plan   Diagnoses and all orders for this visit:  Type 1 diabetes mellitus without complication  -     POCT glycosylated hemoglobin (Hb A1C) manually resulted  -     POCT fingerstick glucose manually resulted  -     Lantus Solostar U-100 Insulin 100 unit/mL (3 mL) pen; Inject 16 Units under the skin once daily at bedtime.  -     insulin lispro (HumaLOG) 100 unit/mL pen; 1 unit for 10 g carbs with sliding scale, ( max 40 units daily)  Uses self-applied continuous glucose monitoring device       31 yo woman wit recent Dx of DM type 1 BRADY positive came for follow up   she was admitted to [facility] on May 12th 2022 with DKA and was discharged on may 15th 2022      She was started on omnipod 5 pump around 12/10/2022. NOT ON OMNIPOD 5 and DEXCOM G6 CGM since Jan 2025 dur  to insurance not covering it   Currently on basal bolus regimen     Current regimen :   Using humalog 1 unit for 10 g carbs. Approx . No SSI    Lantus 0-0-0-16 ( sometimes takes 14 units)     CGM downloaded and reviewed. Active time 98 % , TIR 54 % , low 1 % avg      PLAN :   Discussed to go on insulin pump again.  Given information for OmniPod 5 rep and discussed about twiist pump  Continue same lantus   Change Humalog 1 unit for 12 g of carbs for lunch and 1 unit for 8 g of carb for dinner  Discussed hypoglycemia management  [facility] velingo info given in past   Discussed insulin should not cost > 35 $ per month per insulin   Discussed diabetic diet and diet modifications as a means to control diabetes  She mention she has a glucagon pen at home that she has not used.  She also carries glucose gels    RTC 2 weeks     SH-- 4 " siblings   Recently got - Lamonte  Working with [employer] services and more active now   FH- 2 brothers and maternal brother had T1 DM   PMH PSH reviewed

## 2025-07-23 ENCOUNTER — APPOINTMENT (OUTPATIENT)
Dept: ENDOCRINOLOGY | Facility: CLINIC | Age: 30
End: 2025-07-23
Payer: COMMERCIAL

## 2025-07-24 DIAGNOSIS — E10.9 TYPE 1 DIABETES MELLITUS WITHOUT COMPLICATION: Primary | ICD-10-CM

## 2025-07-24 DIAGNOSIS — E10.65 TYPE 1 DIABETES MELLITUS WITH HYPERGLYCEMIA, WITH LONG-TERM CURRENT USE OF INSULIN: ICD-10-CM

## 2025-07-24 RX ORDER — BLOOD-GLUCOSE SENSOR
EACH MISCELLANEOUS
Qty: 9 EACH | Refills: 3 | Status: SHIPPED | OUTPATIENT
Start: 2025-07-24

## 2025-07-24 RX ORDER — BLOOD-GLUCOSE SENSOR
EACH MISCELLANEOUS
Qty: 5 EACH | Refills: 2 | Status: SHIPPED | OUTPATIENT
Start: 2025-07-24

## 2025-08-17 ENCOUNTER — PATIENT MESSAGE (OUTPATIENT)
Dept: ENDOCRINOLOGY | Facility: CLINIC | Age: 30
End: 2025-08-17
Payer: COMMERCIAL

## 2025-08-17 DIAGNOSIS — E10.65 TYPE 1 DIABETES MELLITUS WITH HYPERGLYCEMIA, WITH LONG-TERM CURRENT USE OF INSULIN: Primary | ICD-10-CM

## 2025-08-18 RX ORDER — BLOOD-GLUCOSE SENSOR
EACH MISCELLANEOUS
Qty: 2 EACH | Refills: 1 | Status: SHIPPED | OUTPATIENT
Start: 2025-08-18

## 2025-10-13 ENCOUNTER — APPOINTMENT (OUTPATIENT)
Dept: ENDOCRINOLOGY | Facility: CLINIC | Age: 30
End: 2025-10-13
Payer: COMMERCIAL

## 2025-10-15 ENCOUNTER — APPOINTMENT (OUTPATIENT)
Dept: ENDOCRINOLOGY | Facility: CLINIC | Age: 30
End: 2025-10-15
Payer: COMMERCIAL

## 2026-02-17 ENCOUNTER — APPOINTMENT (OUTPATIENT)
Dept: ENDOCRINOLOGY | Facility: CLINIC | Age: 31
End: 2026-02-17
Payer: COMMERCIAL

## 2026-06-10 ENCOUNTER — APPOINTMENT (OUTPATIENT)
Dept: ENDOCRINOLOGY | Facility: CLINIC | Age: 31
End: 2026-06-10
Payer: COMMERCIAL